# Patient Record
(demographics unavailable — no encounter records)

---

## 2017-01-24 NOTE — RAD
DATE: 1/24/2017.



EXAM: DIGITAL SCREEN BILAT W/CAD



HISTORY: Routine screening.



COMPARISON: 1/12/2016.



This study was interpreted with the benefit of Computerized Aided Detection

(CAD).







FINDINGS: Both breasts show mild density. The small nodular density in the

anteromedial right breast appears stable. No new mass or malignant appearing

microcalcifications are seen. The axillae are unremarkable.  







IMPRESSION: No mammographic features suspicious for malignancy are identified.







BI-RADS CATEGORY: 2 BENIGN FINDING(S)



RECOMMENDED FOLLOW-UP: 12M 12 MONTH FOLLOW-UP



PQRS compliance statement: Patient information was entered into a reminder

system with a target due date     for the next mammogram.



Mammography is a sensitive method for finding small breast cancers, but it

does not detect them all and is not a substitute for careful clinical

examination.  A negative mammogram does not negate a clinically suspicious

finding and should not result in delay in biopsying a clinically suspicious

abnormality.



"Our facility is accredited by the [employer] Mammography

Program."